# Patient Record
Sex: FEMALE | Race: WHITE | NOT HISPANIC OR LATINO | Employment: FULL TIME | URBAN - METROPOLITAN AREA
[De-identification: names, ages, dates, MRNs, and addresses within clinical notes are randomized per-mention and may not be internally consistent; named-entity substitution may affect disease eponyms.]

---

## 2020-12-21 ENCOUNTER — OFFICE VISIT (OUTPATIENT)
Dept: URGENT CARE | Facility: PHYSICIAN GROUP | Age: 25
End: 2020-12-21
Payer: COMMERCIAL

## 2020-12-21 VITALS
BODY MASS INDEX: 21.14 KG/M2 | WEIGHT: 135 LBS | SYSTOLIC BLOOD PRESSURE: 102 MMHG | HEART RATE: 62 BPM | OXYGEN SATURATION: 100 % | DIASTOLIC BLOOD PRESSURE: 56 MMHG | TEMPERATURE: 98.4 F

## 2020-12-21 DIAGNOSIS — S09.90XA INJURY OF HEAD, INITIAL ENCOUNTER: ICD-10-CM

## 2020-12-21 DIAGNOSIS — S06.0X0A CONCUSSION WITHOUT LOSS OF CONSCIOUSNESS, INITIAL ENCOUNTER: ICD-10-CM

## 2020-12-21 PROCEDURE — 99203 OFFICE O/P NEW LOW 30 MIN: CPT | Performed by: PHYSICIAN ASSISTANT

## 2020-12-21 ASSESSMENT — ENCOUNTER SYMPTOMS
EYE REDNESS: 0
COUGH: 0
NUMBNESS: 0
FEVER: 0
BLURRED VISION: 0
NAUSEA: 0
EYE DISCHARGE: 0
SHORTNESS OF BREATH: 0
SORE THROAT: 0
HEADACHES: 1
WEAKNESS: 0

## 2020-12-22 NOTE — PROGRESS NOTES
Subjective:      Emiliana Cyr is a 25 y.o. female who presents with Head Injury (x2wks snowboarding, headache, nausea, fogginess)        Head Injury   The incident occurred more than 1 week ago (x 2 weeks ago). The injury mechanism was a fall. There was no loss of consciousness. There was no blood loss. The quality of the pain is described as throbbing. The pain is mild. The pain has been intermittent since the injury. Associated symptoms include headaches and vomiting (now resolved). Pertinent negatives include no blurred vision, numbness or weakness. She has tried acetaminophen for the symptoms.     The patient presents to clinic secondary to a head injury x2 weeks ago.  The patient states she fell while snowboarding, hitting the back of her head on the hard snow.  The patient was not wearing a helmet at the time of injury.  She reports no LOC.  The patient states she was able to get up and keep snowboarding following the injury.  The patient states she developed a headache following the injury.  The patient states her headache gradually improved.  The patient describes her headache as throbbing.  The patient states she is still experiencing intermittent headaches to the frontal region.  The patient states she typically develops a headache at night after a long day at work.  The patient reports no radiation of pain.  She reports intermittent nausea.  The patient reports 1 episode of vomiting x1 week ago, which she attributes to drinking alcohol. The patient also notes intermittent fogginess.  The patient reports no persistent or continued vomiting.  No vision changes.  No numbness, tingling, or weakness of her extremities.  The patient is taken Tylenol for her current symptoms.      PMH:  has no past medical history on file.  MEDS: No current outpatient medications on file.  ALLERGIES: No Known Allergies  SURGHX: No past surgical history on file.  SOCHX:  reports that she has never smoked. She has never used  smokeless tobacco. She reports previous alcohol use. She reports that she does not use drugs.  FH: Family history was reviewed, no pertinent findings to report      Review of Systems   Constitutional: Negative for fever.   HENT: Negative for congestion, ear pain and sore throat.    Eyes: Negative for blurred vision, discharge and redness.   Respiratory: Negative for cough and shortness of breath.    Cardiovascular: Negative for chest pain and leg swelling.   Gastrointestinal: Positive for vomiting (now resolved). Negative for nausea.   Musculoskeletal: Negative for joint pain.   Skin: Negative for rash.   Neurological: Positive for headaches. Negative for weakness and numbness.   All other systems reviewed and are negative.         Objective:     /56   Pulse 62   Temp 36.9 °C (98.4 °F) (Temporal)   Wt 61.2 kg (135 lb)   SpO2 100%   BMI 21.14 kg/m²      Physical Exam  Constitutional:       General: She is not in acute distress.     Appearance: Normal appearance. She is not ill-appearing.   HENT:      Head: Normocephalic and atraumatic.      Right Ear: Tympanic membrane, ear canal and external ear normal.      Left Ear: Tympanic membrane, ear canal and external ear normal.      Nose: Nose normal.      Mouth/Throat:      Mouth: Mucous membranes are moist.      Pharynx: Oropharynx is clear. No posterior oropharyngeal erythema.   Eyes:      Extraocular Movements: Extraocular movements intact.      Conjunctiva/sclera: Conjunctivae normal.      Pupils: Pupils are equal, round, and reactive to light.   Neck:      Musculoskeletal: Normal range of motion and neck supple. No neck rigidity or muscular tenderness.   Cardiovascular:      Rate and Rhythm: Normal rate and regular rhythm.      Heart sounds: Normal heart sounds.   Pulmonary:      Effort: Pulmonary effort is normal. No respiratory distress.      Breath sounds: Normal breath sounds. No wheezing.   Musculoskeletal: Normal range of motion.   Skin:     General:  Skin is warm and dry.   Neurological:      General: No focal deficit present.      Mental Status: She is alert and oriented to person, place, and time.      GCS: GCS eye subscore is 4. GCS verbal subscore is 5. GCS motor subscore is 6.      Cranial Nerves: Cranial nerves are intact.      Sensory: Sensation is intact.      Motor: Motor function is intact.      Gait: Gait is intact.                 Assessment/Plan:        1. Injury of head, initial encounter    2. Concussion without loss of consciousness, initial encounter    The patient's presenting symptoms and physical exam findings are consistent with a concussion secondary to an acute head injury.  The patient's physical exam today in clinic was normal.  No focal neurological deficits were appreciated.  Advised the patient her current symptoms are likely related to an acute concussion.  Educated the patient on signs and symptoms of a concussion.  Instructed the patient to avoid strenuous activity, as well as recurrent head injury.  Advised the patient she should rest, as well as avoid increased screen time.  Recommend OTC medications and supportive care for symptomatic management.  Recommend patient follow-up with her PCP.  Discussed return precautions with the patient, and she verbalized understanding.    Differential diagnoses, supportive care, and indications for immediate follow-up discussed with patient.   Instructed to return to clinic or nearest emergency department for any change in condition, further concerns, or worsening of symptoms.    OTC Tylenol or Motrin for fever/discomfort.  Drink plenty of fluids  Avoid strenuous activity  Avoid recurrent head injury  Rest  Follow-up with PCP  AVS printed  Return to clinic or go to the ED if symptoms worsen or fail to improve, or if the patient should develop worsening/increasing/persistent headache, vision changes, neck pain/stiffness, nausea/vomiting, numbness, tingling, weakness of the extremities,  fever/chills, dizziness, altered mental status, and/or any concerning symptoms.    Discussed plan with the patient, and she agrees to the above.     Please note that this dictation was created using voice recognition software. I have made every reasonable attempt to correct obvious errors, but I expect that there may be errors of grammar and possibly content that I did not discover before finalizing the note.

## 2020-12-22 NOTE — PATIENT INSTRUCTIONS
Concussion, Adult    A concussion is a brain injury from a hard, direct hit (trauma) to the head or body. This direct hit causes the brain to shake quickly back and forth inside the skull. This can damage brain cells and cause chemical changes in the brain. A concussion may also be known as a mild traumatic brain injury (TBI).  Concussions are usually not life-threatening, but the effects of a concussion can be serious. If you have a concussion, you should be very careful to avoid having a second concussion.  What are the causes?  This condition is caused by:  · A direct hit to your head, such as:  ? Running into another player during a game.  ? Being hit in a fight.  ? Hitting your head on a hard surface.  · Sudden movement of your body that causes your brain to move back and forth inside the skull, such as in a car crash.  What are the signs or symptoms?  The signs of a concussion can be hard to notice. Early on, they may be missed by you, family members, and health care providers. You may look fine on the outside but may act or feel differently.  Symptoms are usually temporary and most often improve in 7-10 days. Some symptoms appear right away, but other symptoms may not show up for hours or days. If your symptoms last longer than normal, you may have post-concussion syndrome. Every head injury is different.  Physical symptoms  · Headaches. This can include a feeling of pressure in the head or migraine-like symptoms.  · Tiredness (fatigue).  · Dizziness.  · Problems with coordination or balance.  · Vision or hearing problems.  · Sensitivity to light or noise.  · Nausea or vomiting.  · Changes in eating or sleeping patterns.  · Numbness or tingling.  · Seizure.  Mental and emotional symptoms  · Memory problems.  · Trouble concentrating, organizing, or making decisions.  · Slowness in thinking, acting or reacting, speaking, or reading.  · Irritability or mood changes.  · Anxiety or depression.  How is this  diagnosed?  This condition is diagnosed based on:  · Your symptoms.  · A description of your injury.  You may also have tests, including:  · Imaging tests, such as a CT scan or MRI.  · Neuropsychological tests. These measure your thinking, understanding, learning, and remembering abilities.  How is this treated?  Treatment for this condition includes:  · Stopping sports or activity if you are injured. If you hit your head or show signs of concussion:  ? Do not return to sports or activities the same day.  ? Get checked by a health care provider before you return to your activities.  · Physical and mental rest and careful observation, usually at home. Gradually return to your normal activities.  · Medicines to help with symptoms such as headaches, nausea, or difficulty sleeping.  ? Avoid taking opioid pain medicine while recovering from a concussion.  · Avoiding alcohol and drugs. These may slow your recovery and can put you at risk of further injury.  · Referral to a concussion clinic or rehabilitation center.  Recovery from a concussion can take time. How fast you recover depends on many factors. Return to activities only when:  · Your symptoms are completely gone.  · Your health care provider says that it is safe.  Follow these instructions at home:  Activity  · Limit activities that require a lot of thought or concentration, such as:  ? Doing homework or job-related work.  ? Watching TV.  ? Working on the computer or phone.  ? Playing memory games and puzzles.  · Rest. Rest helps your brain heal. Make sure you:  ? Get plenty of sleep. Most adults should get 7-9 hours of sleep each night.  ? Rest during the day. Take naps or rest breaks when you feel tired.  · Avoid physical activity like exercise until your health care provider says it is safe. Stop any activity that worsens symptoms.  · Do not do high-risk activities that could cause a second concussion, such as riding a bike or playing sports.  · Ask your  health care provider when you can return to your normal activities, such as school, work, athletics, and driving. Your ability to react may be slower after a brain injury. Never do these activities if you are dizzy. Your health care provider will likely give you a plan for gradually returning to activities.  General instructions    · Take over-the-counter and prescription medicines only as told by your health care provider. Some medicines, such as blood thinners (anticoagulants) and aspirin, may increase the risk for complications, such as bleeding.  · Do not drink alcohol until your health care provider says you can.  · Watch your symptoms and tell others around you to do the same. Complications sometimes occur after a concussion. Older adults with a brain injury may have a higher risk of serious complications.  · Tell your , teachers, school nurse, school counselor, , or  about your injury, symptoms, and restrictions.  · Keep all follow-up visits as told by your health care provider. This is important.  How is this prevented?  Avoiding another brain injury is very important. In rare cases, another injury can lead to permanent brain damage, brain swelling, or death. The risk of this is greatest during the first 7-10 days after a head injury. Avoid injuries by:  · Stopping activities that could lead to a second concussion, such as contact or recreational sports, until your health care provider says it is okay.  · Taking these actions once you have returned to sports or activities:  ? Avoiding plays or moves that can cause you to crash into another person. This is how most concussions occur.  ? Following the rules and being respectful of other players. Do not engage in violent or illegal plays.  · Getting regular exercise that includes strength and balance training.  · Wearing a properly fitting helmet during sports, biking, or other activities. Helmets can help protect you from  serious skull and brain injuries, but they do not protect you from a concussion. Even when wearing a helmet, you should avoid being hit in the head.  Contact a health care provider if:  · Your symptoms get worse or they do not improve.  · You have new symptoms.  · You have another injury.  Get help right away if:  · You have severe or worsening headaches.  · You have weakness or numbness in any part of your body.  · You are confused.  · Your coordination gets worse.  · You vomit repeatedly.  · You are sleepier than normal.  · Your speech is slurred.  · You cannot recognize people or places.  · You have a seizure.  · It is difficult to wake you up.  · You have unusual behavior changes.  · You have changes in your vision.  · You lose consciousness.  Summary  · A concussion is a brain injury that results from a hard, direct hit (trauma) to your head or body.  · You may have imaging tests and neuropsychological tests to diagnose a concussion.  · Treatment for this condition includes physical and mental rest and careful observation.  · Ask your health care provider when you can return to your normal activities, such as school, work, athletics, and driving.  · Get help right away if you have a severe headache, weakness on one side of the body, seizures, behavior changes, changes in vision, or if you are confused or sleepier than normal.  This information is not intended to replace advice given to you by your health care provider. Make sure you discuss any questions you have with your health care provider.  Document Released: 03/09/2005 Document Revised: 08/08/2019 Document Reviewed: 08/08/2019  Elsevier Patient Education © 2020 [employer]

## 2020-12-27 ASSESSMENT — ENCOUNTER SYMPTOMS: VOMITING: 1

## 2020-12-30 ENCOUNTER — HOSPITAL ENCOUNTER (OUTPATIENT)
Dept: RADIOLOGY | Facility: MEDICAL CENTER | Age: 25
End: 2020-12-30
Attending: PHYSICIAN ASSISTANT
Payer: COMMERCIAL

## 2020-12-30 ENCOUNTER — OFFICE VISIT (OUTPATIENT)
Dept: URGENT CARE | Facility: PHYSICIAN GROUP | Age: 25
End: 2020-12-30
Payer: COMMERCIAL

## 2020-12-30 VITALS
OXYGEN SATURATION: 99 % | TEMPERATURE: 98.9 F | HEIGHT: 69 IN | HEART RATE: 79 BPM | SYSTOLIC BLOOD PRESSURE: 110 MMHG | DIASTOLIC BLOOD PRESSURE: 62 MMHG | RESPIRATION RATE: 16 BRPM | WEIGHT: 136 LBS | BODY MASS INDEX: 20.14 KG/M2

## 2020-12-30 DIAGNOSIS — S09.90XD INJURY OF HEAD, SUBSEQUENT ENCOUNTER: ICD-10-CM

## 2020-12-30 DIAGNOSIS — R42 DIZZINESS: ICD-10-CM

## 2020-12-30 DIAGNOSIS — G44.319 ACUTE POST-TRAUMATIC HEADACHE, NOT INTRACTABLE: ICD-10-CM

## 2020-12-30 DIAGNOSIS — F07.81 POST CONCUSSION SYNDROME: ICD-10-CM

## 2020-12-30 PROCEDURE — 70450 CT HEAD/BRAIN W/O DYE: CPT

## 2020-12-30 PROCEDURE — 99214 OFFICE O/P EST MOD 30 MIN: CPT | Performed by: PHYSICIAN ASSISTANT

## 2020-12-30 RX ORDER — ACETAMINOPHEN 325 MG/1
650 TABLET ORAL EVERY 4 HOURS PRN
COMMUNITY

## 2020-12-30 ASSESSMENT — ENCOUNTER SYMPTOMS
BLURRED VISION: 0
DIZZINESS: 0
DISORIENTATION: 0
FEVER: 0
MUSCULOSKELETAL NEGATIVE: 1
NUMBNESS: 0
NAUSEA: 0
CARDIOVASCULAR NEGATIVE: 1
MEMORY LOSS: 0
LOSS OF CONSCIOUSNESS: 0
RESPIRATORY NEGATIVE: 1
SENSORY CHANGE: 0
CHILLS: 0
VOMITING: 0
DIARRHEA: 0
HEADACHES: 1
ABDOMINAL PAIN: 0
TINGLING: 0
EYES NEGATIVE: 1

## 2020-12-30 ASSESSMENT — PAIN SCALES - GENERAL: PAINLEVEL: 5=MODERATE PAIN

## 2020-12-30 NOTE — PROGRESS NOTES
Subjective:      Emiliana Cyr is a 25 y.o. female who presents with Concussion (came to  x1 week ago, headache,  wants a ct)            Head injury while snowboarding 2-3 weeks ago.  Was seen in urgent care.  This is her second visit.  She has ongoing headaches, fatigue, fogginess.  She denies dizziness, blurry vision, vomiting.    Head Injury   The incident occurred more than 1 week ago. The injury mechanism was a fall. There was no loss of consciousness. There was no blood loss. The quality of the pain is described as aching. The pain has been constant since the injury. Associated symptoms include headaches. Pertinent negatives include no blurred vision, disorientation, memory loss, numbness, tinnitus or vomiting. Associated symptoms comments: foggyness. She has tried acetaminophen for the symptoms. The treatment provided mild relief.       PMH:  has no past medical history on file.  MEDS:   Current Outpatient Medications:   •  acetaminophen (TYLENOL) 325 MG Tab, Take 650 mg by mouth every four hours as needed., Disp: , Rfl:   ALLERGIES: No Known Allergies  SURGHX: No past surgical history on file.  SOCHX:  reports that she has never smoked. She has never used smokeless tobacco. She reports previous alcohol use. She reports that she does not use drugs.  FH: family history is not on file.    Review of Systems   Constitutional: Negative for chills, fever and malaise/fatigue.   HENT: Negative.  Negative for tinnitus.    Eyes: Negative.  Negative for blurred vision.   Respiratory: Negative.    Cardiovascular: Negative.    Gastrointestinal: Negative for abdominal pain, diarrhea, nausea and vomiting.   Musculoskeletal: Negative.    Neurological: Positive for headaches. Negative for dizziness, tingling, sensory change, loss of consciousness and numbness.   Psychiatric/Behavioral: Negative for memory loss.       Medications, Allergies, and current problem list reviewed today in Epic     Objective:     /62    "Pulse 79   Temp 37.2 °C (98.9 °F) (Temporal)   Resp 16   Ht 1.753 m (5' 9\")   Wt 61.7 kg (136 lb)   SpO2 99%   BMI 20.08 kg/m²      Physical Exam  Vitals signs and nursing note reviewed.   Constitutional:       General: She is not in acute distress.     Appearance: She is well-developed. She is not diaphoretic.   HENT:      Head: Normocephalic and atraumatic.   Eyes:      Extraocular Movements: Extraocular movements intact.      Conjunctiva/sclera: Conjunctivae normal.      Pupils: Pupils are equal, round, and reactive to light.   Neck:      Musculoskeletal: Normal range of motion and neck supple.   Cardiovascular:      Rate and Rhythm: Normal rate and regular rhythm.      Heart sounds: Normal heart sounds.   Pulmonary:      Effort: Pulmonary effort is normal. No respiratory distress.      Breath sounds: Normal breath sounds. No wheezing.   Abdominal:      General: Abdomen is flat. There is no distension.      Tenderness: There is no abdominal tenderness. There is no right CVA tenderness, left CVA tenderness, guarding or rebound.   Skin:     General: Skin is warm and dry.   Neurological:      General: No focal deficit present.      Mental Status: She is alert and oriented to person, place, and time. Mental status is at baseline.      Cranial Nerves: Cranial nerves are intact. No cranial nerve deficit.      Sensory: Sensation is intact. No sensory deficit.      Motor: Motor function is intact. No weakness.      Coordination: Coordination is intact. Coordination normal.      Gait: Gait is intact. Gait normal.      Deep Tendon Reflexes: Reflexes normal.   Psychiatric:         Attention and Perception: Attention normal.         Mood and Affect: Mood normal.         Speech: Speech normal.         Behavior: Behavior normal.         Thought Content: Thought content normal.         Cognition and Memory: Cognition and memory normal.         Judgment: Judgment normal.                 Assessment/Plan:         1. Injury " of head, subsequent encounter  CT-HEAD W/O   2. Acute post-traumatic headache, not intractable  CT-HEAD W/O   3. Post concussion syndrome       Second visit to urgent care following head injury.  Ongoing headaches, fatigue, general fogginess.  Denies amnesia, dizziness, blurry vision, vomiting.  Vital signs normal.  Neuro exam normal.  CT ordered which did not show any acute intracranial abnormalities.  Ongoing postconcussive symptoms.  Clearance for work provided.  OTC meds and conservative measures as discussed    Return to clinic or go to ED if symptoms worsen or persist. Indications for ED discussed at length. Patient/Parent/Guardian voices understanding. Follow-up with your primary care provider in 3-5 days. Red flag symptoms discussed. All side effects of medication discussed including allergic response, GI upset, tendon injury, rash, sedation etc.    Please note that this dictation was created using voice recognition software. I have made every reasonable attempt to correct obvious errors, but I expect that there are errors of grammar and possibly content that I did not discover before finalizing the note.

## 2021-09-21 ENCOUNTER — OFFICE VISIT (OUTPATIENT)
Dept: URGENT CARE | Facility: PHYSICIAN GROUP | Age: 26
End: 2021-09-21
Payer: COMMERCIAL

## 2021-09-21 ENCOUNTER — HOSPITAL ENCOUNTER (OUTPATIENT)
Facility: MEDICAL CENTER | Age: 26
End: 2021-09-21
Attending: PHYSICIAN ASSISTANT
Payer: COMMERCIAL

## 2021-09-21 VITALS
RESPIRATION RATE: 17 BRPM | TEMPERATURE: 99.3 F | DIASTOLIC BLOOD PRESSURE: 70 MMHG | SYSTOLIC BLOOD PRESSURE: 112 MMHG | OXYGEN SATURATION: 97 % | HEIGHT: 69 IN | BODY MASS INDEX: 19.7 KG/M2 | WEIGHT: 133 LBS | HEART RATE: 80 BPM

## 2021-09-21 DIAGNOSIS — R35.0 INCREASED URINARY FREQUENCY: ICD-10-CM

## 2021-09-21 DIAGNOSIS — J30.0 VASOMOTOR RHINITIS: ICD-10-CM

## 2021-09-21 LAB
APPEARANCE UR: CLEAR
BILIRUB UR STRIP-MCNC: NORMAL MG/DL
COLOR UR AUTO: NORMAL
GLUCOSE UR STRIP.AUTO-MCNC: NORMAL MG/DL
INT CON NEG: NEGATIVE
INT CON POS: NORMAL
KETONES UR STRIP.AUTO-MCNC: NORMAL MG/DL
LEUKOCYTE ESTERASE UR QL STRIP.AUTO: NORMAL
NITRITE UR QL STRIP.AUTO: NORMAL
PH UR STRIP.AUTO: 5.5 [PH] (ref 5–8)
POC URINE PREGNANCY TEST: NORMAL
PROT UR QL STRIP: NORMAL MG/DL
RBC UR QL AUTO: NORMAL
SP GR UR STRIP.AUTO: 1.02
UROBILINOGEN UR STRIP-MCNC: NORMAL MG/DL

## 2021-09-21 PROCEDURE — 87086 URINE CULTURE/COLONY COUNT: CPT

## 2021-09-21 PROCEDURE — 99214 OFFICE O/P EST MOD 30 MIN: CPT | Performed by: PHYSICIAN ASSISTANT

## 2021-09-21 PROCEDURE — 81025 URINE PREGNANCY TEST: CPT | Performed by: PHYSICIAN ASSISTANT

## 2021-09-21 PROCEDURE — 81002 URINALYSIS NONAUTO W/O SCOPE: CPT | Performed by: PHYSICIAN ASSISTANT

## 2021-09-21 RX ORDER — NITROFURANTOIN 25; 75 MG/1; MG/1
100 CAPSULE ORAL 2 TIMES DAILY
Qty: 10 CAPSULE | Refills: 0 | Status: SHIPPED | OUTPATIENT
Start: 2021-09-21 | End: 2021-09-26

## 2021-09-21 RX ORDER — AZELASTINE 1 MG/ML
1 SPRAY, METERED NASAL 2 TIMES DAILY
Qty: 30 ML | Refills: 0 | Status: SHIPPED | OUTPATIENT
Start: 2021-09-21 | End: 2023-03-30

## 2021-09-21 RX ORDER — DIPHENHYDRAMINE HCL 25 MG
25 TABLET ORAL EVERY 6 HOURS PRN
COMMUNITY
End: 2023-03-30

## 2021-09-21 RX ORDER — PHENAZOPYRIDINE HYDROCHLORIDE 200 MG/1
TABLET, FILM COATED ORAL
Qty: 6 TABLET | Refills: 0 | Status: SHIPPED | OUTPATIENT
Start: 2021-09-21 | End: 2023-03-30

## 2021-09-21 ASSESSMENT — ENCOUNTER SYMPTOMS
SHORTNESS OF BREATH: 0
SORE THROAT: 0
DIARRHEA: 0
COUGH: 0
CHILLS: 0
ABDOMINAL PAIN: 0
FEVER: 0
NAUSEA: 0
FLANK PAIN: 0
MYALGIAS: 0
HEADACHES: 0
CONSTIPATION: 0
VOMITING: 0
EYE PAIN: 0

## 2021-09-21 NOTE — PROGRESS NOTES
"Subjective:   Emiliana Cyr is a 25 y.o. female who presents for Dysuria (x 2 weeks, frequency)      HPI:  25 years old female presents with urinary frequency worse for last 2 weeks.  Notes some issue with urge incontinence. No f/c. No flank pain.    Review of Systems   Constitutional: Negative for chills and fever.   HENT: Negative for congestion, ear pain and sore throat.    Eyes: Negative for pain.   Respiratory: Negative for cough and shortness of breath.    Cardiovascular: Negative for chest pain.   Gastrointestinal: Negative for abdominal pain, constipation, diarrhea, nausea and vomiting.   Genitourinary: Positive for frequency. Negative for dysuria, flank pain, hematuria and urgency.   Musculoskeletal: Negative for myalgias.   Skin: Negative for rash.   Neurological: Negative for headaches.       Medications:    • acetaminophen Tabs  • azelastine  • diphenhydrAMINE Tabs  • nitrofurantoin Caps  • phenazopyridine Tabs    Allergies: Patient has no known allergies.    Problem List: Emiliana Cyr does not have a problem list on file.    Surgical History:  No past surgical history on file.    Past Social Hx: Emiliana Cyr  reports that she has never smoked. She has never used smokeless tobacco. She reports previous alcohol use. She reports that she does not use drugs.     Past Family Hx:  Emiliana Cyr family history is not on file.     Problem list, medications, and allergies reviewed by myself today in Epic.     Objective:     /70   Pulse 80   Temp 37.4 °C (99.3 °F)   Resp 17   Ht 1.753 m (5' 9\")   Wt 60.3 kg (133 lb)   SpO2 97%   BMI 19.64 kg/m²     Physical Exam  Vitals reviewed.   Constitutional:       Appearance: Normal appearance.   HENT:      Head: Normocephalic and atraumatic.      Right Ear: External ear normal.      Left Ear: External ear normal.      Nose: Nose normal.      Mouth/Throat:      Mouth: Mucous membranes are moist.   Eyes:      Conjunctiva/sclera: Conjunctivae normal. "   Cardiovascular:      Rate and Rhythm: Normal rate.   Pulmonary:      Effort: Pulmonary effort is normal.   Abdominal:      Tenderness: There is no right CVA tenderness or left CVA tenderness.   Skin:     General: Skin is warm and dry.      Capillary Refill: Capillary refill takes less than 2 seconds.   Neurological:      Mental Status: She is alert and oriented to person, place, and time.         Assessment/Plan:     Diagnosis and associated orders:     1. Increased urinary frequency  POCT Urinalysis    POCT PREGNANCY    URINE CULTURE(NEW)    REFERRAL TO UROLOGY    nitrofurantoin (MACROBID) 100 MG Cap    phenazopyridine (PYRIDIUM) 200 MG Tab   2. Vasomotor rhinitis  azelastine (ASTELIN) 137 MCG/SPRAY nasal spray      Comments/MDM:     • ua equivocal  • Will culture  • Trial of macrobid, but likely should follow up with urology as symptoms could represent other non-infectious pathology  • Notes bad allergy symptoms, try astelin         Differential diagnosis, natural history, supportive care, and indications for immediate follow-up discussed.    Advised the patient to follow-up with the primary care physician for recheck, reevaluation, and consideration of further management.    Please note that this dictation was created using voice recognition software. I have made a reasonable attempt to correct obvious errors, but I expect that there are errors of grammar and possibly content that I did not discover before finalizing the note.    This note was electronically signed by Will Hardwick PA-C

## 2021-09-24 LAB
BACTERIA UR CULT: NORMAL
SIGNIFICANT IND 70042: NORMAL
SITE SITE: NORMAL
SOURCE SOURCE: NORMAL

## 2022-07-11 ENCOUNTER — OFFICE VISIT (OUTPATIENT)
Dept: URGENT CARE | Facility: PHYSICIAN GROUP | Age: 27
End: 2022-07-11

## 2022-07-11 VITALS
HEART RATE: 69 BPM | WEIGHT: 146 LBS | RESPIRATION RATE: 14 BRPM | HEIGHT: 69 IN | SYSTOLIC BLOOD PRESSURE: 110 MMHG | BODY MASS INDEX: 21.62 KG/M2 | TEMPERATURE: 97.6 F | OXYGEN SATURATION: 95 % | DIASTOLIC BLOOD PRESSURE: 80 MMHG

## 2022-07-11 DIAGNOSIS — R07.9 CHEST PAIN, UNSPECIFIED TYPE: ICD-10-CM

## 2022-07-12 NOTE — PROGRESS NOTES
Patient with syncopal episode 10 days ago. With possible recurrent episodes over the past week.  Now with centralized chest pain.  Did initial EKG which showed normal sinus rhythm but patient does not have insurance and needs further work-up in the emergency department.  No charge for the visit.  Money refunded.  She is going via private vehicle to an ER for proper evaluation.

## 2023-01-13 ENCOUNTER — HOSPITAL ENCOUNTER (OUTPATIENT)
Facility: MEDICAL CENTER | Age: 28
End: 2023-01-13
Attending: PHYSICIAN ASSISTANT
Payer: COMMERCIAL

## 2023-01-13 ENCOUNTER — OFFICE VISIT (OUTPATIENT)
Dept: URGENT CARE | Facility: PHYSICIAN GROUP | Age: 28
End: 2023-01-13
Payer: COMMERCIAL

## 2023-01-13 ENCOUNTER — HOSPITAL ENCOUNTER (OUTPATIENT)
Dept: RADIOLOGY | Facility: MEDICAL CENTER | Age: 28
End: 2023-01-13
Attending: PHYSICIAN ASSISTANT
Payer: COMMERCIAL

## 2023-01-13 VITALS
SYSTOLIC BLOOD PRESSURE: 122 MMHG | HEART RATE: 78 BPM | TEMPERATURE: 97.7 F | DIASTOLIC BLOOD PRESSURE: 78 MMHG | HEIGHT: 69 IN | OXYGEN SATURATION: 93 % | RESPIRATION RATE: 18 BRPM | BODY MASS INDEX: 20.59 KG/M2 | WEIGHT: 139 LBS

## 2023-01-13 DIAGNOSIS — R10.9 ABDOMINAL CRAMPING: ICD-10-CM

## 2023-01-13 LAB
APPEARANCE UR: CLEAR
BILIRUB UR STRIP-MCNC: NEGATIVE MG/DL
COLOR UR AUTO: YELLOW
GLUCOSE UR STRIP.AUTO-MCNC: NEGATIVE MG/DL
INT CON NEG: NEGATIVE
INT CON POS: POSITIVE
KETONES UR STRIP.AUTO-MCNC: NEGATIVE MG/DL
LEUKOCYTE ESTERASE UR QL STRIP.AUTO: NORMAL
NITRITE UR QL STRIP.AUTO: NEGATIVE
PH UR STRIP.AUTO: 7 [PH] (ref 5–8)
POC URINE PREGNANCY TEST: NEGATIVE
PROT UR QL STRIP: NEGATIVE MG/DL
RBC UR QL AUTO: NEGATIVE
SP GR UR STRIP.AUTO: 1.02
UROBILINOGEN UR STRIP-MCNC: 0.2 MG/DL

## 2023-01-13 PROCEDURE — 87086 URINE CULTURE/COLONY COUNT: CPT

## 2023-01-13 PROCEDURE — 76830 TRANSVAGINAL US NON-OB: CPT

## 2023-01-13 PROCEDURE — 81002 URINALYSIS NONAUTO W/O SCOPE: CPT | Performed by: PHYSICIAN ASSISTANT

## 2023-01-13 PROCEDURE — 81025 URINE PREGNANCY TEST: CPT | Performed by: PHYSICIAN ASSISTANT

## 2023-01-13 PROCEDURE — 99214 OFFICE O/P EST MOD 30 MIN: CPT | Performed by: PHYSICIAN ASSISTANT

## 2023-01-13 ASSESSMENT — ENCOUNTER SYMPTOMS
DIARRHEA: 0
FLANK PAIN: 0
FEVER: 0
HEARTBURN: 0
ABDOMINAL PAIN: 1
VOMITING: 0
BLOOD IN STOOL: 0
NAUSEA: 0
CONSTIPATION: 0

## 2023-01-13 NOTE — PROGRESS NOTES
Subjective:   Emiliana Cyr is a 27 y.o. female who presents today with   Chief Complaint   Patient presents with    Dysmenorrhea     X 9 days        Dysmenorrhea  This is a new problem. Episode onset: 9 days. The problem occurs constantly. The problem has been unchanged. Associated symptoms include abdominal pain. Pertinent negatives include no fever, nausea or vomiting. She has tried nothing for the symptoms. The treatment provided no relief.     Patient states she has been having normal bowel movements about every 2 to 3 days.  She states that she recently started a new birth control over the last few months and her menstrual cycle has been off since then.  She states she had a medical  before starting on the medication.  She plans on following up with her women's health doctor at the end of this month.    PMH:  has no past medical history on file.  MEDS:   Current Outpatient Medications:     diphenhydrAMINE (BENADRYL) 25 MG Tab, Take 25 mg by mouth every 6 hours as needed for Sleep., Disp: , Rfl:     azelastine (ASTELIN) 137 MCG/SPRAY nasal spray, Administer 1 Spray into affected nostril(S) 2 times a day., Disp: 30 mL, Rfl: 0    phenazopyridine (PYRIDIUM) 200 MG Tab, Take 1 tab by mouth up to three times per day only if needed for bladder or urinary pain.  Will turn urine orange., Disp: 6 Tablet, Rfl: 0    acetaminophen (TYLENOL) 325 MG Tab, Take 650 mg by mouth every four hours as needed., Disp: , Rfl:   ALLERGIES: No Known Allergies  SURGHX: No past surgical history on file.  SOCHX:  reports that she has never smoked. She has never used smokeless tobacco. She reports current alcohol use of about 1.2 oz per week. She reports that she does not use drugs.  FH: Reviewed with patient, not pertinent to this visit.     Review of Systems   Constitutional:  Negative for fever.   Gastrointestinal:  Positive for abdominal pain. Negative for blood in stool, constipation, diarrhea, heartburn, melena, nausea and  "vomiting.   Genitourinary:  Negative for dysuria, flank pain, frequency, hematuria and urgency.      Objective:   /78   Pulse 78   Temp 36.5 °C (97.7 °F) (Tympanic)   Resp 18   Ht 1.753 m (5' 9\")   Wt 63 kg (139 lb)   SpO2 93%   BMI 20.53 kg/m²   Physical Exam  Vitals and nursing note reviewed.   Constitutional:       General: She is not in acute distress.     Appearance: Normal appearance. She is well-developed. She is not ill-appearing or toxic-appearing.   HENT:      Head: Normocephalic and atraumatic.      Right Ear: Hearing normal.      Left Ear: Hearing normal.   Cardiovascular:      Rate and Rhythm: Normal rate and regular rhythm.      Heart sounds: Normal heart sounds.   Pulmonary:      Effort: Pulmonary effort is normal.      Breath sounds: Normal breath sounds.   Abdominal:      General: There is no distension.      Palpations: Abdomen is soft.      Tenderness: There is abdominal tenderness in the epigastric area and suprapubic area. There is no right CVA tenderness, left CVA tenderness, guarding or rebound.   Genitourinary:     Comments: Patient deferred  exam  Musculoskeletal:      Comments: Normal movement in all 4 extremities   Skin:     General: Skin is warm and dry.   Neurological:      Mental Status: She is alert.      Coordination: Coordination normal.   Psychiatric:         Mood and Affect: Mood normal.       UA small leuks  Pregnancy negative    Assessment/Plan:   Assessment    1. Abdominal cramping  - POCT Urinalysis  - POCT Pregnancy  - US-PELVIC COMPLETE (TRANSABDOMINAL/TRANSVAGINAL) (COMBO); Future  - URINE CULTURE(NEW); Future    Given patient's lower abdominal cramping symptoms I would recommend following up with ultrasound for evaluation.  Patient has no findings that would be concerning for kidney stone on exam and no GI symptoms that would be concerning for diverticulitis or other forms of colitis.  Would recommend she follow-up with OB/GYN as well.  Discussed with patient " I would recommend cutting back on ibuprofen which she states she has been using 600 mg of daily for the past week and a half and states she used ibuprofen significantly last year as an ulcer could also be potential cause of her symptoms in the epigastric area.  No findings that would be suggestive of needing CT scan and no findings that would be suggestive of kidney stones or kidney infection at this time but we will follow-up with Urine culture and treat accordingly if needed.    Differential diagnosis, natural history, supportive care, and indications for immediate follow-up discussed.   Patient given instructions and understanding of medications and treatment.    If not improving in 3-5 days, F/U with PCP or return to UC if symptoms worsen.  Strict ER precautions given.  Patient agreeable to plan.      Please note that this dictation was created using voice recognition software. I have made every reasonable attempt to correct obvious errors, but I expect that there are errors of grammar and possibly content that I did not discover before finalizing the note.    Kosta Johnston PA-C

## 2023-01-16 LAB
BACTERIA UR CULT: NORMAL
SIGNIFICANT IND 70042: NORMAL
SITE SITE: NORMAL
SOURCE SOURCE: NORMAL

## 2023-03-30 ENCOUNTER — OFFICE VISIT (OUTPATIENT)
Dept: MEDICAL GROUP | Facility: MEDICAL CENTER | Age: 28
End: 2023-03-30
Payer: COMMERCIAL

## 2023-03-30 VITALS
BODY MASS INDEX: 21.21 KG/M2 | OXYGEN SATURATION: 98 % | HEART RATE: 77 BPM | TEMPERATURE: 98.7 F | SYSTOLIC BLOOD PRESSURE: 108 MMHG | HEIGHT: 69 IN | DIASTOLIC BLOOD PRESSURE: 64 MMHG | WEIGHT: 143.2 LBS

## 2023-03-30 DIAGNOSIS — R45.89 DEPRESSED MOOD: ICD-10-CM

## 2023-03-30 DIAGNOSIS — R55 SYNCOPE AND COLLAPSE: ICD-10-CM

## 2023-03-30 DIAGNOSIS — Z11.3 ROUTINE SCREENING FOR STI (SEXUALLY TRANSMITTED INFECTION): ICD-10-CM

## 2023-03-30 DIAGNOSIS — F41.9 ANXIETY: ICD-10-CM

## 2023-03-30 DIAGNOSIS — Z11.59 ENCOUNTER FOR HEPATITIS C SCREENING TEST FOR LOW RISK PATIENT: ICD-10-CM

## 2023-03-30 DIAGNOSIS — R00.2 PALPITATION: ICD-10-CM

## 2023-03-30 DIAGNOSIS — R07.89 OTHER CHEST PAIN: ICD-10-CM

## 2023-03-30 DIAGNOSIS — G43.009 MIGRAINE WITHOUT AURA AND WITHOUT STATUS MIGRAINOSUS, NOT INTRACTABLE: ICD-10-CM

## 2023-03-30 PROCEDURE — 99214 OFFICE O/P EST MOD 30 MIN: CPT | Performed by: STUDENT IN AN ORGANIZED HEALTH CARE EDUCATION/TRAINING PROGRAM

## 2023-03-30 PROCEDURE — 93000 ELECTROCARDIOGRAM COMPLETE: CPT | Performed by: STUDENT IN AN ORGANIZED HEALTH CARE EDUCATION/TRAINING PROGRAM

## 2023-03-30 ASSESSMENT — ENCOUNTER SYMPTOMS
CHILLS: 0
PALPITATIONS: 1
SHORTNESS OF BREATH: 0
VOMITING: 0
NAUSEA: 0
DIZZINESS: 0
ORTHOPNEA: 0
WHEEZING: 0
WEIGHT LOSS: 0
HEADACHES: 0
FEVER: 0

## 2023-03-30 ASSESSMENT — ANXIETY QUESTIONNAIRES
2. NOT BEING ABLE TO STOP OR CONTROL WORRYING: MORE THAN HALF THE DAYS
5. BEING SO RESTLESS THAT IT IS HARD TO SIT STILL: MORE THAN HALF THE DAYS
6. BECOMING EASILY ANNOYED OR IRRITABLE: NEARLY EVERY DAY
4. TROUBLE RELAXING: SEVERAL DAYS
1. FEELING NERVOUS, ANXIOUS, OR ON EDGE: MORE THAN HALF THE DAYS
3. WORRYING TOO MUCH ABOUT DIFFERENT THINGS: NEARLY EVERY DAY
IF YOU CHECKED OFF ANY PROBLEMS ON THIS QUESTIONNAIRE, HOW DIFFICULT HAVE THESE PROBLEMS MADE IT FOR YOU TO DO YOUR WORK, TAKE CARE OF THINGS AT HOME, OR GET ALONG WITH OTHER PEOPLE: EXTREMELY DIFFICULT
7. FEELING AFRAID AS IF SOMETHING AWFUL MIGHT HAPPEN: SEVERAL DAYS
GAD7 TOTAL SCORE: 14

## 2023-03-30 ASSESSMENT — PATIENT HEALTH QUESTIONNAIRE - PHQ9
CLINICAL INTERPRETATION OF PHQ2 SCORE: 1
5. POOR APPETITE OR OVEREATING: 3 - NEARLY EVERY DAY
SUM OF ALL RESPONSES TO PHQ QUESTIONS 1-9: 12

## 2023-03-30 NOTE — PROGRESS NOTES
Subjective:     CC:  Diagnoses of Palpitation, Syncope and collapse, Anxiety, Routine screening for STI (sexually transmitted infection), Other chest pain, Migraine without aura and without status migrainosus, not intractable, Encounter for hepatitis C screening test for low risk patient, and Depressed mood were pertinent to this visit.    HISTORY OF THE PRESENT ILLNESS: Patient is a 27 y.o. female. This pleasant patient is here today to establish care and discuss     Problem   Palpitation   Syncope and Collapse    Hx of 1 episode in 07/2022 report history of syncope while a parking, she reports she had just finished eating at restaurant and while walking to parking lot, she fainted. She went to urgent care, obtain EKG and was sent to [facility] for further observation evaluation. She recall she having another EKG done and lab work and told to follow up with cardiology/ PCP. Told to take ibuprofen.   - patient report she became pregnant shortly after, and has not follow.   No fhx of sudden cardiac death  Prior CAD/ MI, heart failure, fhx of suddent death, chest pain     Anxiety    This is a chronic condition since late teens, brought on by family stress. She experience anxiety occasionally in large crowd, gym.   EDIS-7: 14 ( 2023)  PHQ: 12 ( 2023)  TSH:   Associated symptoms:  Denies psychotic features, Denies personal history of PTSD or manic episodes, Denies significant family history of bipolar disorder  Suicidal ideation/homicidal ideation: Denies  Therapy/counseling:   Medications tried:      Other Chest Pain    Report since 2022, she has been concern about her heart so she has stopped trying to exert her self.   Chest pain described as palpitation and dull pain   She reports she has been experiencing palpitation       Migraine Without Aura and Without Status Migrainosus, Not Intractable       Health Maintenance:     ROS:   Review of Systems   Constitutional:  Negative for chills, fever and weight loss.  "  HENT:  Negative for hearing loss.    Respiratory:  Negative for shortness of breath and wheezing.    Cardiovascular:  Positive for chest pain and palpitations. Negative for orthopnea.   Gastrointestinal:  Negative for nausea and vomiting.   Genitourinary:  Negative for frequency and urgency.   Skin:  Negative for rash.   Neurological:  Negative for dizziness and headaches.       Objective:       Exam: /64 (BP Location: Right arm, Patient Position: Sitting, BP Cuff Size: Adult)   Pulse 77   Temp 37.1 °C (98.7 °F) (Temporal)   Ht 1.753 m (5' 9\")   Wt 65 kg (143 lb 3.2 oz)   SpO2 98%  Body mass index is 21.15 kg/m².    Physical Exam  Constitutional:       Appearance: Normal appearance.   Cardiovascular:      Rate and Rhythm: Normal rate and regular rhythm.   Pulmonary:      Effort: Pulmonary effort is normal.      Breath sounds: Normal breath sounds.   Musculoskeletal:      Cervical back: Normal range of motion and neck supple.   Lymphadenopathy:      Cervical: No cervical adenopathy.   Neurological:      Mental Status: She is alert.   Psychiatric:         Behavior: Behavior normal.         Thought Content: Thought content normal.         Judgment: Judgment normal.         Labs:     Assessment & Plan:   27 y.o. female with the following -    1. Palpitation  Chronic, stable  Patient reports since 7/2022 he has been experiencing intermittent palpitations/anxiety/chest tightness not related to exercise tolerance.  Plan Zio patch echocardiogram    2. Syncope and collapse  History of syncope 7/2022, patient follow-up urgent care 2 weeks later EKG at that time did not show ST segment changes, prolonged QT, early excitation, found to be in sinus rhythm.  She reports she was seen at [facility] and had 2 further EKG and lab work done and told to follow-up with primary care and to take ibuprofen.  Plan  - EKG - Clinic Performed - sinus rhythm, no q waves, no st segment changes, no qt prolongation, rate 60s, pr " 114, qtc 385, qrs narrow  - R[facility] ZIO PATCH MONITOR; Future  - EC-ECHOCARDIOGRAM COMPLETE W/O CONT; Future    3. Anxiety  Chronic, unstable  EDIS elevated, patient reports its affecting her life significantly however she is currently not interested in starting medical therapy.  She reports screening as it is affected by recent trial of OCP.  Plan  - TSH WITH REFLEX TO FT4; Future  - Comp Metabolic Panel; Future  - CBC WITHOUT DIFFERENTIAL; Future    4. Routine screening for STI (sexually transmitted infection)    - Chlamydia/GC, PCR (Urine); Future  - T.PALLIDUM AB MCKENNA (SCREENING); Future  - HIV AG/AB COMBO ASSAY SCREENING; Future    5. Other chest pain  Chronic, stable  She reports she has intermittent chest pain since 07/2022.  Not related to exertion.  No family history of early cardiac death or early heart disease.  Plan  - EKG - Clinic Performed  - RIH ZIO PATCH MONITOR; Future  - EC-ECHOCARDIOGRAM COMPLETE W/O CONT; Future    6. Migraine without aura and without status migrainosus, not intractable  History of    7. Encounter for hepatitis C screening test for low risk patient    - HEP C VIRUS ANTIBODY; Future    8. Depressed mood  report associated depressed mood/ stress anxiety  Defer medication therapy and behavioral health   Denies SI/HI  Plan   Continue to monitor      Return in about 2 months (around 5/30/2023) for Lab review, palpitation, chest pain.    Please note that this dictation was created using voice recognition software. I have made every reasonable attempt to correct obvious errors, but I expect that there are errors of grammar and possibly content that I did not discover before finalizing the note.

## 2023-03-31 ENCOUNTER — TELEPHONE (OUTPATIENT)
Dept: HEALTH INFORMATION MANAGEMENT | Facility: OTHER | Age: 28
End: 2023-03-31
Payer: COMMERCIAL

## 2023-04-17 ENCOUNTER — OFFICE VISIT (OUTPATIENT)
Dept: MEDICAL GROUP | Facility: MEDICAL CENTER | Age: 28
End: 2023-04-17
Payer: COMMERCIAL

## 2023-04-17 VITALS
DIASTOLIC BLOOD PRESSURE: 58 MMHG | HEIGHT: 69 IN | OXYGEN SATURATION: 96 % | HEART RATE: 82 BPM | SYSTOLIC BLOOD PRESSURE: 106 MMHG | BODY MASS INDEX: 20.65 KG/M2 | TEMPERATURE: 98.5 F | WEIGHT: 139.4 LBS
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
## 2023-04-17 DIAGNOSIS — Z23 NEED FOR VACCINATION: ICD-10-CM

## 2023-04-17 PROCEDURE — 90715 TDAP VACCINE 7 YRS/> IM: CPT | Performed by: STUDENT IN AN ORGANIZED HEALTH CARE EDUCATION/TRAINING PROGRAM

## 2023-04-17 PROCEDURE — 90471 IMMUNIZATION ADMIN: CPT | Performed by: STUDENT IN AN ORGANIZED HEALTH CARE EDUCATION/TRAINING PROGRAM

## 2023-04-17 PROCEDURE — 99213 OFFICE O/P EST LOW 20 MIN: CPT | Mod: 25 | Performed by: STUDENT IN AN ORGANIZED HEALTH CARE EDUCATION/TRAINING PROGRAM

## 2023-04-17 ASSESSMENT — ENCOUNTER SYMPTOMS
PALPITATIONS: 0
CHILLS: 0
SHORTNESS OF BREATH: 0
WHEEZING: 0
WEIGHT LOSS: 0
FEVER: 0

## 2023-04-17 NOTE — PROGRESS NOTES
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   - 4/13/2023   - patient report she had swelling of right thumb    Health Maintenance:     ROS:  Review of Systems   Constitutional:  Negative for chills, fever, malaise/fatigue and weight loss.   Respiratory:  Negative for shortness of breath and wheezing.    Cardiovascular:  Negative for chest pain and palpitations.   Musculoskeletal:  Negative for joint pain.   Skin:  Negative for itching and rash.     Objective:     Exam:  /58 (BP Location: Left arm, Patient Position: Sitting, BP Cuff Size: Adult)   Pulse 82   Temp 36.9 °C (98.5 °F) (Temporal)   Ht 1.753 m (5' 9\")   Wt 63.2 kg (139 lb 6.4 oz)   SpO2 96%   BMI 20.59 kg/m²  Body mass index is 20.59 kg/m².    Physical Exam  Constitutional:       Appearance: Normal appearance.   Cardiovascular:      Rate and Rhythm: Normal rate and regular rhythm.   Pulmonary:      Effort: Pulmonary effort is normal.      Breath sounds: Normal breath sounds.   Neurological:      Mental Status: She is alert.     Labs:     Assessment & Plan:     27 y.o. female with the following -       1. Needle stick, hypodermic, accidental, initial encounter  Acute  Patient reports 4/13/2023 she was cleaning a barn and reported had a accidental needlestick to her right thumb.  Report needle is from old needle for horse vaccination.  Report initially after the needlestick injury there was significant erythema and swelling which has resolved in the past 2 days.  She no longer has pain in her fingers.  She denies significant fever chills skin rashes joint skin issues pain.  Plan  Tdap was given today  Low risk for his zoonotic transmission/ infection  Recommend speaking with vet for further recommendation  Monitor symptoms and follow up if symptomatic    - CBC WITH DIFFERENTIAL; Future  - Comp Metabolic Panel; Future    2. Need for " vaccination    - Tdap =>8yo IM              Return in about 3 months (around 7/17/2023) for Lab review.    Please note that this dictation was created using voice recognition software. I have made every reasonable attempt to correct obvious errors, but I expect that there are errors of grammar and possibly content that I did not discover before finalizing the note.

## 2023-07-05 ENCOUNTER — OFFICE VISIT (OUTPATIENT)
Dept: URGENT CARE | Facility: PHYSICIAN GROUP | Age: 28
End: 2023-07-05
Payer: COMMERCIAL

## 2023-07-05 VITALS
RESPIRATION RATE: 14 BRPM | TEMPERATURE: 98.9 F | OXYGEN SATURATION: 98 % | SYSTOLIC BLOOD PRESSURE: 118 MMHG | HEIGHT: 69 IN | WEIGHT: 140 LBS | DIASTOLIC BLOOD PRESSURE: 78 MMHG | HEART RATE: 84 BPM | BODY MASS INDEX: 20.73 KG/M2

## 2023-07-05 DIAGNOSIS — R10.2 PELVIC PAIN: ICD-10-CM

## 2023-07-05 LAB
APPEARANCE UR: CLEAR
BILIRUB UR STRIP-MCNC: NEGATIVE MG/DL
COLOR UR AUTO: NORMAL
GLUCOSE UR STRIP.AUTO-MCNC: NEGATIVE MG/DL
KETONES UR STRIP.AUTO-MCNC: NEGATIVE MG/DL
LEUKOCYTE ESTERASE UR QL STRIP.AUTO: NEGATIVE
NITRITE UR QL STRIP.AUTO: NEGATIVE
PH UR STRIP.AUTO: 6 [PH] (ref 5–8)
POCT INT CON NEG: NEGATIVE
POCT INT CON POS: NEGATIVE
POCT URINE PREGNANCY TEST: NEGATIVE
PROT UR QL STRIP: NEGATIVE MG/DL
RBC UR QL AUTO: NORMAL
SP GR UR STRIP.AUTO: 1.02
UROBILINOGEN UR STRIP-MCNC: NORMAL MG/DL

## 2023-07-05 PROCEDURE — 3074F SYST BP LT 130 MM HG: CPT | Performed by: FAMILY MEDICINE

## 2023-07-05 PROCEDURE — 81002 URINALYSIS NONAUTO W/O SCOPE: CPT | Performed by: FAMILY MEDICINE

## 2023-07-05 PROCEDURE — 99213 OFFICE O/P EST LOW 20 MIN: CPT | Performed by: FAMILY MEDICINE

## 2023-07-05 PROCEDURE — 81025 URINE PREGNANCY TEST: CPT | Performed by: FAMILY MEDICINE

## 2023-07-05 PROCEDURE — 3078F DIAST BP <80 MM HG: CPT | Performed by: FAMILY MEDICINE

## 2023-07-05 NOTE — PROGRESS NOTES
"  Subjective:      27 y.o. female presents to urgent care for pelvic pain that started last Monday. There was no inciting event or trauma at that time. Pain is constant, it's described as pressure and bloating, currently rated 5/10. She is currently sexually active with one, male partner, and they do not use any form of contraception. She is supposed to start her menstrual cycle today. No changes to urinary urgency, frequency, dysuria, hematuria.  Bowel movements are regular.  No prior history of abdominal surgery. Last pap was earlier in 2023 and was normal. She had pelvic ultrasound in February 2023 for similar symptoms, this was within normal limits.     She denies any other questions or concerns at this time.    Current problem list, medication, and past medical/surgical history were reviewed in Epic.    ROS  See HPI     Objective:      /78   Pulse 84   Temp 37.2 °C (98.9 °F)   Resp 14   Ht 1.753 m (5' 9\")   Wt 63.5 kg (140 lb)   SpO2 98%   BMI 20.67 kg/m²     Physical Exam  Constitutional:       General: She is not in acute distress.     Appearance: She is not diaphoretic.   Cardiovascular:      Rate and Rhythm: Normal rate and regular rhythm.      Heart sounds: Normal heart sounds.   Pulmonary:      Effort: Pulmonary effort is normal. No respiratory distress.      Breath sounds: Normal breath sounds.   Abdominal:      General: Bowel sounds are normal.      Palpations: Abdomen is soft.      Tenderness: There is no abdominal tenderness. There is no right CVA tenderness or left CVA tenderness.   Neurological:      Mental Status: She is alert.   Psychiatric:         Mood and Affect: Affect normal.         Judgment: Judgment normal.       Assessment/Plan:     1. Pelvic pain  hCG negative.  No sign of infection on urinalysis.  Pelvic ultrasound was ordered and scheduled for today.  Patient canceled the appointment after leaving urgent care.  - POCT Urinalysis  - POCT PREGNANCY  - US-PELVIC COMPLETE " (TRANSABDOMINAL/TRANSVAGINAL) (COMBO); Future      Instructed to return to Urgent Care or nearest Emergency Department if symptoms fail to improve, for any change in condition, further concerns, or new concerning symptoms. Patient states understanding of the plan of care and discharge instructions.    Catherine Mercedes M.D.

## 2023-07-26 ENCOUNTER — APPOINTMENT (OUTPATIENT)
Dept: MEDICAL GROUP | Facility: MEDICAL CENTER | Age: 28
End: 2023-07-26
Payer: COMMERCIAL

## 2023-09-18 ENCOUNTER — OFFICE VISIT (OUTPATIENT)
Dept: MEDICAL GROUP | Facility: MEDICAL CENTER | Age: 28
End: 2023-09-18
Payer: COMMERCIAL

## 2023-09-18 ENCOUNTER — HOSPITAL ENCOUNTER (OUTPATIENT)
Dept: LAB | Facility: MEDICAL CENTER | Age: 28
End: 2023-09-18
Attending: STUDENT IN AN ORGANIZED HEALTH CARE EDUCATION/TRAINING PROGRAM
Payer: COMMERCIAL

## 2023-09-18 VITALS
DIASTOLIC BLOOD PRESSURE: 70 MMHG | RESPIRATION RATE: 16 BRPM | TEMPERATURE: 97.2 F | OXYGEN SATURATION: 97 % | SYSTOLIC BLOOD PRESSURE: 110 MMHG | HEART RATE: 72 BPM | HEIGHT: 69 IN | BODY MASS INDEX: 20.73 KG/M2 | WEIGHT: 140 LBS

## 2023-09-18 DIAGNOSIS — Z11.3 ROUTINE SCREENING FOR STI (SEXUALLY TRANSMITTED INFECTION): ICD-10-CM

## 2023-09-18 DIAGNOSIS — R55 SYNCOPE AND COLLAPSE: ICD-10-CM

## 2023-09-18 DIAGNOSIS — Z11.59 ENCOUNTER FOR HEPATITIS C SCREENING TEST FOR LOW RISK PATIENT: ICD-10-CM

## 2023-09-18 DIAGNOSIS — F41.9 ANXIETY: ICD-10-CM

## 2023-09-18 LAB
ERYTHROCYTE [DISTWIDTH] IN BLOOD BY AUTOMATED COUNT: 39.8 FL (ref 35.9–50)
HCT VFR BLD AUTO: 40.6 % (ref 37–47)
HCV AB SER QL: NORMAL
HGB BLD-MCNC: 13.1 G/DL (ref 12–16)
HIV 1+2 AB+HIV1 P24 AG SERPL QL IA: NORMAL
MCH RBC QN AUTO: 27.1 PG (ref 27–33)
MCHC RBC AUTO-ENTMCNC: 32.3 G/DL (ref 32.2–35.5)
MCV RBC AUTO: 84.1 FL (ref 81.4–97.8)
PLATELET # BLD AUTO: 350 K/UL (ref 164–446)
PMV BLD AUTO: 9.9 FL (ref 9–12.9)
RBC # BLD AUTO: 4.83 M/UL (ref 4.2–5.4)
T PALLIDUM AB SER QL IA: NORMAL
TSH SERPL DL<=0.005 MIU/L-ACNC: 1.73 UIU/ML (ref 0.38–5.33)
WBC # BLD AUTO: 8.8 K/UL (ref 4.8–10.8)

## 2023-09-18 PROCEDURE — 85027 COMPLETE CBC AUTOMATED: CPT

## 2023-09-18 PROCEDURE — 36415 COLL VENOUS BLD VENIPUNCTURE: CPT

## 2023-09-18 PROCEDURE — 99214 OFFICE O/P EST MOD 30 MIN: CPT

## 2023-09-18 PROCEDURE — 3078F DIAST BP <80 MM HG: CPT

## 2023-09-18 PROCEDURE — 1126F AMNT PAIN NOTED NONE PRSNT: CPT

## 2023-09-18 PROCEDURE — 84443 ASSAY THYROID STIM HORMONE: CPT

## 2023-09-18 PROCEDURE — 87491 CHLMYD TRACH DNA AMP PROBE: CPT

## 2023-09-18 PROCEDURE — 3074F SYST BP LT 130 MM HG: CPT

## 2023-09-18 PROCEDURE — 80053 COMPREHEN METABOLIC PANEL: CPT

## 2023-09-18 PROCEDURE — 87389 HIV-1 AG W/HIV-1&-2 AB AG IA: CPT

## 2023-09-18 PROCEDURE — 87591 N.GONORRHOEAE DNA AMP PROB: CPT

## 2023-09-18 PROCEDURE — 86780 TREPONEMA PALLIDUM: CPT

## 2023-09-18 PROCEDURE — 86803 HEPATITIS C AB TEST: CPT

## 2023-09-18 ASSESSMENT — PAIN SCALES - GENERAL: PAINLEVEL: NO PAIN

## 2023-09-18 ASSESSMENT — ENCOUNTER SYMPTOMS
PALPITATIONS: 0
FEVER: 0
CHILLS: 0
COUGH: 0
ORTHOPNEA: 0
SHORTNESS OF BREATH: 0

## 2023-09-18 NOTE — PROGRESS NOTES
Subjective:     CC: Other (Fainted 09/16/2023 in and out / in the process patient lost bladder control / she was seeing black lines/pressure behind ears/ with tension headache/ the whole process lasted per patient about 20 min. )      HPI:   Emiliana is a 27 y.o. female who presents today for:    Syncope and collapse: on 09/16/2023 at a race in [place]. She reports having 2 long drinks, that she brought from home. During the event she lost control of her bladder, while she was awake.  She endorses LOC, she believes it was roughly 20 seconds, but she does not know for certain. She states she did hit her head on the elizabeth potty.  No one witnessed the event.  She describes seeing a black outlines of objects after this occurred.  She felt like she is going to pass out again, and laid down on the ground with her eyes closed for several minutes before getting up from the ground. Later that night she had pressure behind ears with tension headache. The whole process lasted per patient about 20 min. She did not go to the ER after this occurred. She denies a history of seizures. She denies chest pain, shortness of breath. She endorses excessive sweating after the event.     Allergies: Patient has no known allergies.     Medications:   Current Outpatient Medications:     acetaminophen (TYLENOL) 325 MG Tab, Take 650 mg by mouth every four hours as needed., Disp: , Rfl:       ROS:  Review of Systems   Constitutional:  Negative for chills and fever.   Respiratory:  Negative for cough and shortness of breath.    Cardiovascular:  Negative for chest pain, palpitations, orthopnea and leg swelling.       Objective:     Exam:  There were no vitals taken for this visit. There is no height or weight on file to calculate BMI.    Physical Exam  Constitutional:       Appearance: Normal appearance.   Eyes:      Pupils: Pupils are equal, round, and reactive to light.   Cardiovascular:      Rate and Rhythm: Normal rate and regular rhythm.       Pulses: Normal pulses.      Heart sounds: Normal heart sounds.   Pulmonary:      Effort: Pulmonary effort is normal.      Breath sounds: Normal breath sounds.   Abdominal:      General: Bowel sounds are normal.      Palpations: Abdomen is soft.   Neurological:      Mental Status: She is alert and oriented to person, place, and time.   Psychiatric:         Mood and Affect: Mood normal.         Behavior: Behavior normal.           Assessment & Plan:     Emiliana pereira 27 y.o. female with the following -     1. Syncope and collapse  Undiagnosed problem with uncertain prognosis  Patient did not complete blood work that was ordered by PCP in March, I instructed her to go to the lab to get her blood work done today if possible.  She did not complete the echocardiogram or Zip patch. I provided the patient the phone number to call and schedule echocardiogram and Zio patch. Will consider EEG and referral to neurology if symptoms persist. 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 EKG in March showed NSR, HR normal today.   - [facility] ZIO PATCH MONITOR; Future  - complete blood work  - schedule echocardiogram  - follow up in 1 week to go over labs        Anticipatory guidance included the following: Patient counseled about skin care, diet, supplements, smoking, drugs/alcohol use, safe sex and exercise.     Return in about 1 week (around 9/25/2023).    Please note that this dictation was created using voice recognition software. I have made every reasonable attempt to correct obvious errors, but I expect that there are errors of grammar and possibly content that I did not discover before finalizing the note.

## 2023-09-19 LAB
ALBUMIN SERPL BCP-MCNC: 4.8 G/DL (ref 3.2–4.9)
ALBUMIN/GLOB SERPL: 1.8 G/DL
ALP SERPL-CCNC: 50 U/L (ref 30–99)
ALT SERPL-CCNC: 16 U/L (ref 2–50)
ANION GAP SERPL CALC-SCNC: 11 MMOL/L (ref 7–16)
AST SERPL-CCNC: 19 U/L (ref 12–45)
BILIRUB SERPL-MCNC: 0.3 MG/DL (ref 0.1–1.5)
BUN SERPL-MCNC: 11 MG/DL (ref 8–22)
C TRACH DNA SPEC QL NAA+PROBE: POSITIVE
CALCIUM ALBUM COR SERPL-MCNC: 9.3 MG/DL (ref 8.5–10.5)
CALCIUM SERPL-MCNC: 9.9 MG/DL (ref 8.5–10.5)
CHLORIDE SERPL-SCNC: 103 MMOL/L (ref 96–112)
CO2 SERPL-SCNC: 22 MMOL/L (ref 20–33)
CREAT SERPL-MCNC: 0.78 MG/DL (ref 0.5–1.4)
GFR SERPLBLD CREATININE-BSD FMLA CKD-EPI: 106 ML/MIN/1.73 M 2
GLOBULIN SER CALC-MCNC: 2.7 G/DL (ref 1.9–3.5)
GLUCOSE SERPL-MCNC: 79 MG/DL (ref 65–99)
N GONORRHOEA DNA SPEC QL NAA+PROBE: NEGATIVE
POTASSIUM SERPL-SCNC: 4.4 MMOL/L (ref 3.6–5.5)
PROT SERPL-MCNC: 7.5 G/DL (ref 6–8.2)
SODIUM SERPL-SCNC: 136 MMOL/L (ref 135–145)
SPECIMEN SOURCE: ABNORMAL

## 2023-09-21 ENCOUNTER — TELEPHONE (OUTPATIENT)
Dept: MEDICAL GROUP | Facility: MEDICAL CENTER | Age: 28
End: 2023-09-21
Payer: COMMERCIAL

## 2023-09-21 DIAGNOSIS — A74.9 CHLAMYDIA INFECTION: ICD-10-CM

## 2023-09-21 DIAGNOSIS — Z11.3 ROUTINE SCREENING FOR STI (SEXUALLY TRANSMITTED INFECTION): ICD-10-CM

## 2023-09-21 RX ORDER — AZITHROMYCIN 500 MG/1
1000 TABLET, FILM COATED ORAL DAILY
Qty: 2 TABLET | Refills: 0 | Status: SHIPPED | OUTPATIENT
Start: 2023-09-21

## 2023-09-22 ENCOUNTER — TELEPHONE (OUTPATIENT)
Dept: HEALTH INFORMATION MANAGEMENT | Facility: OTHER | Age: 28
End: 2023-09-22
Payer: COMMERCIAL